# Patient Record
Sex: MALE | Race: WHITE | HISPANIC OR LATINO | Employment: UNEMPLOYED | URBAN - METROPOLITAN AREA
[De-identification: names, ages, dates, MRNs, and addresses within clinical notes are randomized per-mention and may not be internally consistent; named-entity substitution may affect disease eponyms.]

---

## 2017-12-12 ENCOUNTER — NON-PROVIDER VISIT (OUTPATIENT)
Dept: URGENT CARE | Facility: CLINIC | Age: 47
End: 2017-12-12

## 2017-12-12 DIAGNOSIS — Z02.1 PRE-EMPLOYMENT DRUG SCREENING: ICD-10-CM

## 2017-12-12 LAB
AMP AMPHETAMINE: NORMAL
COC COCAINE: NORMAL
INT CON NEG: NEGATIVE
INT CON POS: POSITIVE
MET METHAMPHETAMINES: NORMAL
OPI OPIATES: NORMAL
PCP PHENCYCLIDINE: NORMAL
POC DRUG COMMENT 753798-OCCUPATIONAL HEALTH: NORMAL
THC: NORMAL

## 2017-12-12 PROCEDURE — 80305 DRUG TEST PRSMV DIR OPT OBS: CPT | Performed by: NURSE PRACTITIONER

## 2019-12-21 ENCOUNTER — APPOINTMENT (OUTPATIENT)
Dept: RADIOLOGY | Facility: MEDICAL CENTER | Age: 49
End: 2019-12-21
Attending: EMERGENCY MEDICINE

## 2019-12-21 ENCOUNTER — HOSPITAL ENCOUNTER (EMERGENCY)
Facility: MEDICAL CENTER | Age: 49
End: 2019-12-21
Attending: EMERGENCY MEDICINE

## 2019-12-21 VITALS
SYSTOLIC BLOOD PRESSURE: 118 MMHG | HEART RATE: 59 BPM | DIASTOLIC BLOOD PRESSURE: 58 MMHG | TEMPERATURE: 98.4 F | RESPIRATION RATE: 15 BRPM | HEIGHT: 66 IN | BODY MASS INDEX: 24.91 KG/M2 | WEIGHT: 155 LBS | OXYGEN SATURATION: 95 %

## 2019-12-21 DIAGNOSIS — S00.83XA CONTUSION OF FACE, INITIAL ENCOUNTER: ICD-10-CM

## 2019-12-21 DIAGNOSIS — F11.20 METHADONE DEPENDENCE (HCC): ICD-10-CM

## 2019-12-21 DIAGNOSIS — R56.9 SEIZURE (HCC): ICD-10-CM

## 2019-12-21 LAB
ALBUMIN SERPL BCP-MCNC: 4.2 G/DL (ref 3.2–4.9)
ALBUMIN/GLOB SERPL: 1.9 G/DL
ALP SERPL-CCNC: 58 U/L (ref 30–99)
ALT SERPL-CCNC: 15 U/L (ref 2–50)
AMPHET UR QL SCN: NEGATIVE
ANION GAP SERPL CALC-SCNC: 5 MMOL/L (ref 0–11.9)
AST SERPL-CCNC: 23 U/L (ref 12–45)
BARBITURATES UR QL SCN: NEGATIVE
BASOPHILS # BLD AUTO: 0.3 % (ref 0–1.8)
BASOPHILS # BLD: 0.03 K/UL (ref 0–0.12)
BENZODIAZ UR QL SCN: NEGATIVE
BILIRUB SERPL-MCNC: 0.4 MG/DL (ref 0.1–1.5)
BUN SERPL-MCNC: 13 MG/DL (ref 8–22)
BZE UR QL SCN: NEGATIVE
CALCIUM SERPL-MCNC: 8.8 MG/DL (ref 8.5–10.5)
CANNABINOIDS UR QL SCN: POSITIVE
CHLORIDE SERPL-SCNC: 104 MMOL/L (ref 96–112)
CO2 SERPL-SCNC: 25 MMOL/L (ref 20–33)
CREAT SERPL-MCNC: 1.08 MG/DL (ref 0.5–1.4)
EKG IMPRESSION: NORMAL
EOSINOPHIL # BLD AUTO: 0.03 K/UL (ref 0–0.51)
EOSINOPHIL NFR BLD: 0.3 % (ref 0–6.9)
ERYTHROCYTE [DISTWIDTH] IN BLOOD BY AUTOMATED COUNT: 40.8 FL (ref 35.9–50)
GLOBULIN SER CALC-MCNC: 2.2 G/DL (ref 1.9–3.5)
GLUCOSE SERPL-MCNC: 103 MG/DL (ref 65–99)
HCT VFR BLD AUTO: 50 % (ref 42–52)
HGB BLD-MCNC: 17.1 G/DL (ref 14–18)
IMM GRANULOCYTES # BLD AUTO: 0.05 K/UL (ref 0–0.11)
IMM GRANULOCYTES NFR BLD AUTO: 0.5 % (ref 0–0.9)
LYMPHOCYTES # BLD AUTO: 0.45 K/UL (ref 1–4.8)
LYMPHOCYTES NFR BLD: 4.9 % (ref 22–41)
MCH RBC QN AUTO: 30.6 PG (ref 27–33)
MCHC RBC AUTO-ENTMCNC: 34.2 G/DL (ref 33.7–35.3)
MCV RBC AUTO: 89.4 FL (ref 81.4–97.8)
METHADONE UR QL SCN: POSITIVE
MONOCYTES # BLD AUTO: 0.44 K/UL (ref 0–0.85)
MONOCYTES NFR BLD AUTO: 4.8 % (ref 0–13.4)
NEUTROPHILS # BLD AUTO: 8.18 K/UL (ref 1.82–7.42)
NEUTROPHILS NFR BLD: 89.2 % (ref 44–72)
NRBC # BLD AUTO: 0 K/UL
NRBC BLD-RTO: 0 /100 WBC
OPIATES UR QL SCN: NEGATIVE
OXYCODONE UR QL SCN: NEGATIVE
PCP UR QL SCN: NEGATIVE
PLATELET # BLD AUTO: 239 K/UL (ref 164–446)
PMV BLD AUTO: 8.4 FL (ref 9–12.9)
POC BREATHALIZER: 0.01 PERCENT (ref 0–0.01)
POTASSIUM SERPL-SCNC: 3.9 MMOL/L (ref 3.6–5.5)
PROPOXYPH UR QL SCN: NEGATIVE
PROT SERPL-MCNC: 6.4 G/DL (ref 6–8.2)
RBC # BLD AUTO: 5.59 M/UL (ref 4.7–6.1)
SODIUM SERPL-SCNC: 134 MMOL/L (ref 135–145)
TROPONIN T SERPL-MCNC: 13 NG/L (ref 6–19)
WBC # BLD AUTO: 9.2 K/UL (ref 4.8–10.8)

## 2019-12-21 PROCEDURE — 93005 ELECTROCARDIOGRAM TRACING: CPT | Performed by: EMERGENCY MEDICINE

## 2019-12-21 PROCEDURE — 70450 CT HEAD/BRAIN W/O DYE: CPT

## 2019-12-21 PROCEDURE — 302970 POC BREATHALIZER: Performed by: EMERGENCY MEDICINE

## 2019-12-21 PROCEDURE — A9270 NON-COVERED ITEM OR SERVICE: HCPCS | Performed by: EMERGENCY MEDICINE

## 2019-12-21 PROCEDURE — 84484 ASSAY OF TROPONIN QUANT: CPT

## 2019-12-21 PROCEDURE — 80307 DRUG TEST PRSMV CHEM ANLYZR: CPT

## 2019-12-21 PROCEDURE — 99284 EMERGENCY DEPT VISIT MOD MDM: CPT

## 2019-12-21 PROCEDURE — 700102 HCHG RX REV CODE 250 W/ 637 OVERRIDE(OP): Performed by: EMERGENCY MEDICINE

## 2019-12-21 PROCEDURE — 85025 COMPLETE CBC W/AUTO DIFF WBC: CPT

## 2019-12-21 PROCEDURE — 80053 COMPREHEN METABOLIC PANEL: CPT

## 2019-12-21 RX ORDER — METHADONE HYDROCHLORIDE 5 MG/1
75 TABLET ORAL
COMMUNITY

## 2019-12-21 RX ORDER — IBUPROFEN 600 MG/1
600 TABLET ORAL ONCE
Status: COMPLETED | OUTPATIENT
Start: 2019-12-21 | End: 2019-12-21

## 2019-12-21 RX ADMIN — IBUPROFEN 600 MG: 600 TABLET ORAL at 07:02

## 2019-12-21 ASSESSMENT — ENCOUNTER SYMPTOMS
FEVER: 0
HEADACHES: 0
DIZZINESS: 0
SHORTNESS OF BREATH: 0
NECK PAIN: 0
ABDOMINAL PAIN: 1
FALLS: 1
CONSTIPATION: 1
BACK PAIN: 0

## 2019-12-21 NOTE — ED NOTES
Pt given d/c instructions and verbalized understanding. Pt ambulates without difficulty and is A & O x 4. Pt discharged with wife, Sanjuana.

## 2019-12-21 NOTE — ED TRIAGE NOTES
Pt arrived via EMS in no apparent distress. Pt's wife called EMS. As per pt's wife pt woke up in acute delirium. Pt was found deficating in the refrigerator and floor. Pt was then found in the bathroom banging his head on the bathtub. Pt has no recollection of event.

## 2019-12-21 NOTE — ED PROVIDER NOTES
"ED Provider Note    ED Provider Note    Primary care provider: Paul Ramirez M.D.  Means of arrival: EMS  History obtained from: PAtient  History limited by: Recollection of event    CHIEF COMPLAINT  Chief Complaint   Patient presents with   • Psychiatric Evaluation       HPI  Tim Whitaker is a 49 y.o. male who presents to the Emergency Department via EMS.  Patient states that he remembers being awake, being in his normal state of health feeling the need to go to the bathroom which because he takes methadone, does not happen but every 3 or 4 days.  He went to go to the bathroom had severe abdominal pain and pain in his anus.  He recollects having a large bowel movement that was quite painful and then remembers waking up in an ambulance.  His wife, who is not available for interview at this time, called EMS.  He understands, that she was worried about a possible seizure or psychosis patient has a history of testicular cancer diagnosed in 2000.  He was treated and is reportedly in remission.  Last evaluation was 10 years ago when his markers were negative.  He is on methadone though he reports not yet taking his dose today for a history of heroin addiction.  He denies alcohol use.  Does not use drugs other than occasional recreational marijuana.  Currently, he has no abdominal pain or pain in his anus.  He states this resolved after his bowel movement.  This is never happened to him before.  He denies any recent trauma or falls.  No cough or cold symptoms.  No chest pain, shortness of breath, headache, fever, chills.  He states his life has been very good recently.  He states he walks everywhere and he walks fast.  He considers himself to be in quite good health.  He had a cardiac work-up a few years ago including a stress test which he states he passed with \"flying colors\".  He has been employed for \"3 years, and he wants it to remain the same\".    REVIEW OF SYSTEMS  Review of Systems   Constitutional: Negative for " "fever.   HENT: Negative for congestion.    Respiratory: Negative for shortness of breath.    Cardiovascular: Negative for chest pain.   Gastrointestinal: Positive for abdominal pain and constipation.   Genitourinary: Negative for dysuria and urgency.   Musculoskeletal: Positive for falls. Negative for back pain, joint pain and neck pain.   Skin: Negative for rash.   Neurological: Negative for dizziness and headaches.       PAST MEDICAL HISTORY   has a past medical history of Cancer (HCC) and Mood disorder (HCC) (2/10/2016).    SURGICAL HISTORY   has a past surgical history that includes other.    SOCIAL HISTORY  Social History     Tobacco Use   • Smoking status: Never Smoker   Substance Use Topics   • Alcohol use: No   • Drug use: Yes     Comment: marijuana       Social History     Substance and Sexual Activity   Drug Use Yes    Comment: marijuana        FAMILY HISTORY  No family history on file.    CURRENT MEDICATIONS  Home Medications    **Home medications have not yet been reviewed for this encounter**         ALLERGIES  Allergies   Allergen Reactions   • Haldol [Haloperidol Lactate] Anaphylaxis   • Hydrocodone      hives       PHYSICAL EXAM  VITAL SIGNS: /58   Pulse (!) 59   Temp 36.9 °C (98.4 °F) (Temporal)   Resp 15   Ht 1.676 m (5' 6\")   Wt 70.3 kg (155 lb)   SpO2 95%   BMI 25.02 kg/m²   Vitals reviewed.  Constitutional: Patient is oriented to person, place, and time. Appears well-developed and well-nourished. No distress.    Head: Normocephalic and atraumatic, except to the left lateral periorbital area/eyebrow and upper eyelid where there is swelling and ecchymosis..   Ears: Normal external ears bilaterally.   Mouth/Throat: Oropharynx is clear and moist, no exudates.   Eyes: Conjunctivae are normal. Pupils are equal, round, and reactive to light. No subconjunctival hemorrhage.  EOMI.  Neck: Normal range of motion. Neck supple.  No midline tenderness or step-offs.  Cardiovascular: Normal rate, " regular rhythm and normal heart sounds. Normal peripheral pulses.  Pulmonary/Chest: Effort normal and breath sounds normal. No respiratory distress, no wheezes, rhonchi, or rales.  Abdominal: Soft. Bowel sounds are normal. There is no tenderness. No rebound or guarding, or peritoneal signs.  Musculoskeletal: No edema and no tenderness.   Lymphadenopathy: No cervical adenopathy.   Neurological: No focal deficits.   Skin: Skin is warm and dry. No erythema. No pallor.   Psychiatric: Patient has a normal mood and affect.     LABS  Results for orders placed or performed during the hospital encounter of 12/21/19   URINE DRUG SCREEN   Result Value Ref Range    Amphetamines Urine Negative Negative    Barbiturates Negative Negative    Benzodiazepines Negative Negative    Cocaine Metabolite Negative Negative    Methadone Positive (A) Negative    Opiates Negative Negative    Oxycodone Negative Negative    Phencyclidine -Pcp Negative Negative    Propoxyphene Negative Negative    Cannabinoid Metab Positive (A) Negative   CBC WITH DIFFERENTIAL   Result Value Ref Range    WBC 9.2 4.8 - 10.8 K/uL    RBC 5.59 4.70 - 6.10 M/uL    Hemoglobin 17.1 14.0 - 18.0 g/dL    Hematocrit 50.0 42.0 - 52.0 %    MCV 89.4 81.4 - 97.8 fL    MCH 30.6 27.0 - 33.0 pg    MCHC 34.2 33.7 - 35.3 g/dL    RDW 40.8 35.9 - 50.0 fL    Platelet Count 239 164 - 446 K/uL    MPV 8.4 (L) 9.0 - 12.9 fL    Neutrophils-Polys 89.20 (H) 44.00 - 72.00 %    Lymphocytes 4.90 (L) 22.00 - 41.00 %    Monocytes 4.80 0.00 - 13.40 %    Eosinophils 0.30 0.00 - 6.90 %    Basophils 0.30 0.00 - 1.80 %    Immature Granulocytes 0.50 0.00 - 0.90 %    Nucleated RBC 0.00 /100 WBC    Neutrophils (Absolute) 8.18 (H) 1.82 - 7.42 K/uL    Lymphs (Absolute) 0.45 (L) 1.00 - 4.80 K/uL    Monos (Absolute) 0.44 0.00 - 0.85 K/uL    Eos (Absolute) 0.03 0.00 - 0.51 K/uL    Baso (Absolute) 0.03 0.00 - 0.12 K/uL    Immature Granulocytes (abs) 0.05 0.00 - 0.11 K/uL    NRBC (Absolute) 0.00 K/uL   COMP  METABOLIC PANEL   Result Value Ref Range    Sodium 134 (L) 135 - 145 mmol/L    Potassium 3.9 3.6 - 5.5 mmol/L    Chloride 104 96 - 112 mmol/L    Co2 25 20 - 33 mmol/L    Anion Gap 5.0 0.0 - 11.9    Glucose 103 (H) 65 - 99 mg/dL    Bun 13 8 - 22 mg/dL    Creatinine 1.08 0.50 - 1.40 mg/dL    Calcium 8.8 8.5 - 10.5 mg/dL    AST(SGOT) 23 12 - 45 U/L    ALT(SGPT) 15 2 - 50 U/L    Alkaline Phosphatase 58 30 - 99 U/L    Total Bilirubin 0.4 0.1 - 1.5 mg/dL    Albumin 4.2 3.2 - 4.9 g/dL    Total Protein 6.4 6.0 - 8.2 g/dL    Globulin 2.2 1.9 - 3.5 g/dL    A-G Ratio 1.9 g/dL   TROPONIN   Result Value Ref Range    Troponin T 13 6 - 19 ng/L   ESTIMATED GFR   Result Value Ref Range    GFR If African American >60 >60 mL/min/1.73 m 2    GFR If Non African American >60 >60 mL/min/1.73 m 2   POC BREATHALIZER   Result Value Ref Range    POC Breathalizer 0.01 0.00 - 0.01 Percent   EKG (NOW)   Result Value Ref Range    Report       [facility].    Test Date:  2019  Pt Name:    CORTEZ BARRAGAN                  Department: ER  MRN:        [id number]                      Room:       [facility]  Gender:     Male                         Technician: [id number]  :        1970                   Requested By:IZZY SRIVASTAVA  Order #:    [id number]                    Reading MD:    Measurements  Intervals                                Axis  Rate:       67                           P:          68  VT:         176                          QRS:        53  QRSD:       100                          T:          -22  QT:         456  QTc:        482    Interpretive Statements  SINUS RHYTHM  PROBABLE LATERAL INFARCT, OLD  NONSPECIFIC T ABNORMALITIES, INFERIOR LEADS  BORDERLINE PROLONGED QT INTERVAL  No previous ECG available for comparison         All labs reviewed by me.    EKG Interpretation  Interpreted by me    Rhythm: normal sinus   Rate: 67  Axis: normal  Ectopy: none  Conduction: normal  ST Segments: no acute  change  T Waves: T wave inversion in 3 and aVF  Q Waves: none    Clinical Impression: No old EKG for comparison.  Isolated T wave inversion in inferior leads.  No acute ST segment elevation.    RADIOLOGY  CT-HEAD W/O   Final Result      No CT evidence of acute infarct, hemorrhage or mass.        The radiologist's interpretation of all radiological studies have been reviewed by me.    COURSE & MEDICAL DECISION MAKING  Pertinent Labs & Imaging studies reviewed. (See chart for details)    Obtained and reviewed past medical records.  Patient had an EKG treadmill test in 2016.  Last outpatient visit February 2016 he was seen for erectile dysfunction with a history of testicular cancer.  Last ED visit in 2010 for back and shoulder pain.    3:55 AM - Patient seen and examined at bedside.  This is a very pleasant 49-year-old male who presents after an episode where he struck his left face.  Its unclear whether this was syncope or possibly seizure.  Patient is alert and oriented.  He is pleasant, cooperative.  He has little recollection of the actual event only events leading up to it.  His wife is not currently available for interview.  He has been in his normal state of health.  He has no complaints at this time.  Of advised IV establishment, lab evaluation including CBC and chemistry as well as troponin.  Evaluation with EKG and CT of his head.    Differential diagnosis includes but is not limited to: Syncope, seizure, closed head injury, intoxication, mental illness.    6:48 AM patient's reevaluated the bedside.  He is feeling well.  He has no new complaints.  His wife is at the bedside and I discussed with her, what she witnessed.  She states she woke up because she heard banging in the bathroom.  She went to see him and he was bent over the bathtub with his arms stretched behind him and he was banging his head on the bathtub but was unresponsive.  She could not get him to stop.  She states it did not seem like it was  in his control.  Shortly afterwards, he popped up and went out to the front yard where he seemed to be acting strangely.  Then, shortly before paramedics arrived he seemed to be more normal.  At this time, I suspect, that this was likely a seizure.  CT head shows no abnormalities.  Sodium slightly low at 134.  Glucose was elevated 103.  Otherwise, labs were unrevealing.  His troponin is normal.  He tested positive for methadone and marijuana which he admitted to.  CBC was quite normal.  Normal WBC count.  There is a neutrophilic shift.  At this time, I feel the patient can safely be discharged home.  He is advised, we would not start therapy had a first-time seizure however, if he has another seizure he should return for reevaluation.  Or, if he has other bizarre or unexplainable behavior he should return for reevaluation.  Patient is given strict return precautions.    Patient is discharged home in stable condition.    FINAL IMPRESSION  1. Seizure (HCC)    2. Contusion of face, initial encounter    3. Methadone dependence (HCC)